# Patient Record
(demographics unavailable — no encounter records)

---

## 2024-12-05 NOTE — HISTORY OF PRESENT ILLNESS
[] : delivered by vaginal delivery [Male] : Delivery History: baby boy [Breastfeeding] : currently nursing [Postpartum Follow Up] : postpartum follow up [Delivery Date: ___] : on [unfilled] [Boy] : baby is a boy [Back to Normal] : is back to normal in size [Normal] : the vagina was normal [Cervix Sample Taken] : cervical sample not taken for a Pap smear [Examination Of The Breasts] : breasts are normal [Doing Well] : is doing well [No Sign of Infection] : is showing no signs of infection [None] : None [FreeTextEntry8] :  Pt. S/P uncomplicated vaginal birth of viable male.  [FreeTextEntry9] : Uncomplicated prenatal course  [de-identified] : Pt. doing well. No complications.  [de-identified] : Post partum examination following vaginal delivery  [de-identified] : Pt. can resume normal activities with no restrictions. Pt. wishes to use nothing currently for contraception.

## 2025-06-03 NOTE — COUNSELING
[Nutrition/ Exercise/ Weight Management] : nutrition, exercise, weight management [Body Image] : body image [Vitamins/Supplements] : vitamins/supplements [Confidentiality] : confidentiality

## 2025-06-03 NOTE — DISCUSSION/SUMMARY
[FreeTextEntry1] : Xiao is a 23 y.o  (LMP -breastfeeding) presenting for gyn annual   #Well Woman Visit  1. Nutrition/Activity: The benefits of physical activity and balanced diet.  2. Health Screening: She was informed of the benefits of a screening Pap. - Cervix: We reviewed ASCCP/ACOG guidelines for pap smear screening. PAP collected today. 3. Sex Health: The importance of safe-sex practices was discussed with the patient. STD screening was offered to patient, she accepts g/c testing 4. Contraception: does not desire at this time. Discussed condom use 5. Denies any hx of DM/HTN        She verbalized understanding and agreement with above counseling regarding differential diagnosis, evaluation, and plan. She was given time for questions/concerns which were all answered to her apparent satisfaction.    RTO in 1 yr for annual/prn

## 2025-06-03 NOTE — HISTORY OF PRESENT ILLNESS
[Y] : Positive pregnancy history [Regular Cycle Intervals] : periods have been regular [Frequency: Q ___ days] : menstrual periods occur approximately every [unfilled] days [Menarche Age: ____] : age at menarche was [unfilled] [FreeTextEntry1] : Xiao is a 23 y.o  (LMP -breastfeeding) presenting for gyn annual  Overall doing well since delivery No return to menses due to exclusive breastfeeding q3-5h Denies any pelvic pain or abnormal bleeding Returned to sexual activity with male partner--not using any form of contraception Otherwise, no additional concerns   HCM:  - Pap (3/2024): NILM  [PGHxTotal] : 5 [[facility] : [facility] [PGHxPremature] : 0 [PGHxAbortions] : 3 [[facility] [PGHxABInduced] : 1 [PGHxABSpont] : 2 [PGHxEctopic] : 0 [PGHxMultBirths] : 0

## 2025-06-03 NOTE — PHYSICAL EXAM
[Chaperoned Physical Exam] : A chaperone was present in the examining room during all aspects of the physical examination. [MA] : MA [FreeTextEntry1] : Lucinda [Appropriately responsive] : appropriately responsive [Alert] : alert [No Acute Distress] : no acute distress [Soft] : soft [Non-tender] : non-tender [No Lesions] : no lesions [No Mass] : no mass [Oriented x3] : oriented x3 [Examination Of The Breasts] : a normal appearance [No Masses] : no breast masses were palpable [Labia Majora] : normal [Labia Minora] : normal [Normal] : normal [Uterine Adnexae] : normal

## 2025-06-03 NOTE — HISTORY OF PRESENT ILLNESS
[Y] : Positive pregnancy history [Regular Cycle Intervals] : periods have been regular [Frequency: Q ___ days] : menstrual periods occur approximately every [unfilled] days [Menarche Age: ____] : age at menarche was [unfilled] [FreeTextEntry1] : Xiao is a 23 y.o  (LMP -breastfeeding) presenting for gyn annual  Overall doing well since delivery No return to menses due to exclusive breastfeeding q3-5h Denies any pelvic pain or abnormal bleeding Returned to sexual activity with male partner--not using any form of contraception Otherwise, no additional concerns   HCM:  - Pap (3/2024): NILM  [PGHxTotal] : 5 [[facility] [PG[facility]xPremature] : 0 [[facility] [PGHxAbortions] : 3 [PGHxABInduced] : 1 [PGHxABSpont] : 2 [PGHxEctopic] : 0 [PGHxMultBirths] : 0